# Patient Record
Sex: FEMALE | Race: ASIAN | Employment: FULL TIME | URBAN - METROPOLITAN AREA
[De-identification: names, ages, dates, MRNs, and addresses within clinical notes are randomized per-mention and may not be internally consistent; named-entity substitution may affect disease eponyms.]

---

## 2017-09-13 ENCOUNTER — OFFICE VISIT (OUTPATIENT)
Dept: FAMILY MEDICINE CLINIC | Age: 27
End: 2017-09-13

## 2017-09-13 VITALS
SYSTOLIC BLOOD PRESSURE: 108 MMHG | WEIGHT: 124.2 LBS | OXYGEN SATURATION: 98 % | BODY MASS INDEX: 19.96 KG/M2 | HEART RATE: 74 BPM | DIASTOLIC BLOOD PRESSURE: 61 MMHG | TEMPERATURE: 98.6 F | RESPIRATION RATE: 18 BRPM | HEIGHT: 66 IN

## 2017-09-13 DIAGNOSIS — Z00.00 ROUTINE GENERAL MEDICAL EXAMINATION AT A HEALTH CARE FACILITY: Primary | ICD-10-CM

## 2017-09-13 NOTE — PROGRESS NOTES
1. Have you been to the ER, urgent care clinic since your last visit? Hospitalized since your last visit?no  2. Have you seen or consulted any other health care providers outside of the [address] [place] [address] since your last visit? Include any pap smears or colon screening. No      Chief Complaint   Patient presents with    Complete Physical     annual exam     Learning assessment complete  Abuse Screening Questionnaire 9/13/2017   Do you ever feel afraid of your partner? N   Are you in a relationship with someone who physically or mentally threatens you? N   Is it safe for you to go home? Y     Fall Risk Assessment, last 12 mths 9/13/2017   Able to walk? Yes   Fall in past 12 months?  No

## 2017-09-13 NOTE — PROGRESS NOTES
Subjective:   27 y.o. female for Well Woman Check. Her gyne and breast care is done elsewhere by her Ob-Gyne physician. Patient Active Problem List    Diagnosis Date Noted    Dysmenorrhea     Fibrocystic breast changes 07/08/2014    Fibrocystic breast 07/08/2014     Current Outpatient Prescriptions   Medication Sig Dispense Refill    norethindrone-ethinyl estradiol-iron (LOESTRIN FE 1.5/30, 28-DAY,) 1.5 mg-30 mcg (21)/75 mg (7) tab Take 1 Tab by mouth daily. 28 Tab 0    calcipotriene (DOVONEX) 0.005 % topical cream Apply  to affected area two (2) times a day. Solution -apply at hs 60 g 0     No Known Allergies  Past Medical History:   Diagnosis Date    Dysmenorrhea      History reviewed. No pertinent surgical history. History reviewed. No pertinent family history. Social History   Substance Use Topics    Smoking status: Former Smoker    Smokeless tobacco: Never Used      Comment: quit 1 year ago. DId smoike close to 1 PPD for 1.5 years, then 1/2 PPD, then down to 304 cig a day 2 years ago, then quit.  Alcohol use No         ROS: Feeling generally well. No TIA's or unusual headaches, no dysphagia. No prolonged cough. No dyspnea or chest pain on exertion. No abdominal pain, change in bowel habits, black or bloody stools. No urinary tract symptoms. No new or unusual musculoskeletal symptoms. Specific concerns today: none    Objective: The patient appears well, alert, oriented x 3, in no distress. Visit Vitals    /61 (BP 1 Location: Right arm, BP Patient Position: Sitting)    Pulse 74    Temp 98.6 °F (37 °C) (Oral)    Resp 18    Ht 5' 6\" (1.676 m)    Wt 124 lb 3.2 oz (56.3 kg)    LMP 08/23/2017    SpO2 98%    BMI 20.05 kg/m2     ENT normal.  Neck supple. No adenopathy or thyromegaly. SERAFIN. Lungs are clear, good air entry, no wheezes, rhonchi or rales. S1 and S2 normal, no murmurs, regular rate and rhythm. Abdomen soft without tenderness, guarding, mass or organomegaly. Extremities show no edema, normal peripheral pulses. Neurological is normal, no focal findings. Breast and Pelvic exams are deferred. Assessment/Plan:   Well Woman  routine labs ordered    ICD-10-CM ICD-9-CM    1.  Routine general medical examination at a health care facility Z00.00 V70.0 CBC W/O DIFF      LIPID PANEL      METABOLIC PANEL, COMPREHENSIVE      VITAMIN D, 25 HYDROXY      TSH 3RD GENERATION   await labs  Pt was given an after visit summary which includes diagnosis, current medicines and vital and voiced understanding of treatment plan

## 2017-09-13 NOTE — MR AVS SNAPSHOT
Visit Information Date & Time Provider Department Dept. Phone Encounter #  
 9/13/2017  9:00 AM Sebastien Breaux  [facility] [phone] [id number] Upcoming Health Maintenance Date Due DTaP/Tdap/Td series (1 - Tdap) 12/29/2009 PAP AKA CERVICAL CYTOLOGY 8/10/2017 Allergies as of 9/13/2017  Review Complete On: 9/13/2017 By: Sebastien Breaux NP No Known Allergies Current Immunizations  Never Reviewed No immunizations on file. Not reviewed this visit You Were Diagnosed With   
  
 Codes Comments Routine general medical examination at a health care facility    -  Primary ICD-10-CM: Z00.00 ICD-9-CM: V70.0 Vitals BP Pulse Temp Resp Height(growth percentile) Weight(growth percentile) 108/61 (BP 1 Location: Right arm, BP Patient Position: Sitting) 74 98.6 °F (37 °C) (Oral) 18 5' 6\" (1.676 m) 124 lb 3.2 oz (56.3 kg) LMP SpO2 BMI OB Status Smoking Status 08/23/2017 98% 20.05 kg/m2 Having regular periods Former Smoker Vitals History BMI and BSA Data Body Mass Index Body Surface Area 20.05 kg/m 2 1.62 m 2 Preferred Pharmacy Pharmacy Name Phone [address] [facility] [email or web address] [phone] Your Updated Medication List  
  
   
This list is accurate as of: 9/13/17  9:30 AM.  Always use your most recent med list.  
  
  
  
  
 calcipotriene 0.005 % topical cream  
Commonly known as:  Hunter Savin Apply  to affected area two (2) times a day. Solution -apply at hs  
  
 norethindrone-ethinyl estradiol-iron 1.5 mg-30 mcg (21)/75 mg (7) Tab Commonly known as:  LOESTRIN FE 1.5/30 (28-DAY) Take 1 Tab by mouth daily. We Performed the Following CBC W/O DIFF [14545 CPT(R)] LIPID PANEL [97944 CPT(R)] METABOLIC PANEL, COMPREHENSIVE [74278 CPT(R)] TSH 3RD GENERATION [70019 CPT(R)] VITAMIN D, 25 HYDROXY [id number] CPT(R)] Introducing [facility]! Susan Diego introduces [employer] patient portal. Now you can access parts of your medical record, email your doctor's office, and request medication refills online. 1. In your internet browser, go to [email or web address] 2. Click on the First Time User? Click Here link in the Sign In box. You will see the New Member Sign Up page. 3. Enter your [employer] Access Code exactly as it appears below. You will not need to use this code after youve completed the sign-up process. If you do not sign up before the expiration date, you must request a new code. · [employer] Access Code: [email or web address] Expires: 12/12/2017  9:30 AM 
 
4. Enter the last four digits of your Social Security Number (xxxx) and Date of Birth (mm/dd/yyyy) as indicated and click Submit. You will be taken to the next sign-up page. 5. Create a [employer] ID. This will be your [employer] login ID and cannot be changed, so think of one that is secure and easy to remember. 6. Create a [employer] password. You can change your password at any time. 7. Enter your Password Reset Question and Answer. This can be used at a later time if you forget your password. 8. Enter your e-mail address. You will receive e-mail notification when new information is available in [address] 9. Click Sign Up. You can now view and download portions of your medical record. 10. Click the Download Summary menu link to download a portable copy of your medical information. If you have questions, please visit the Frequently Asked Questions section of the [employer] website. Remember, [employer] is NOT to be used for urgent needs. For medical emergencies, dial 911. Now available from your iPhone and Android! Please provide this summary of care documentation to your next provider. Your primary care clinician is listed as Derrick Fishman.  If you have any questions after today's visit, please call [phone].

## 2017-09-14 LAB
25(OH)D3+25(OH)D2 SERPL-MCNC: 15.3 NG/ML (ref 30–100)
ALBUMIN SERPL-MCNC: 4.7 G/DL (ref 3.5–5.5)
ALBUMIN/GLOB SERPL: 1.7 {RATIO} (ref 1.2–2.2)
ALP SERPL-CCNC: 59 IU/L (ref 39–117)
ALT SERPL-CCNC: 16 IU/L (ref 0–32)
AST SERPL-CCNC: 14 IU/L (ref 0–40)
BILIRUB SERPL-MCNC: 0.9 MG/DL (ref 0–1.2)
BUN SERPL-MCNC: 8 MG/DL (ref 6–20)
BUN/CREAT SERPL: 14 (ref 9–23)
CALCIUM SERPL-MCNC: 9.8 MG/DL (ref 8.7–10.2)
CHLORIDE SERPL-SCNC: 101 MMOL/L (ref 96–106)
CHOLEST SERPL-MCNC: 157 MG/DL (ref 100–199)
CO2 SERPL-SCNC: 22 MMOL/L (ref 18–29)
CREAT SERPL-MCNC: 0.56 MG/DL (ref 0.57–1)
ERYTHROCYTE [DISTWIDTH] IN BLOOD BY AUTOMATED COUNT: 13.2 % (ref 12.3–15.4)
GLOBULIN SER CALC-MCNC: 2.7 G/DL (ref 1.5–4.5)
GLUCOSE SERPL-MCNC: 77 MG/DL (ref 65–99)
HCT VFR BLD AUTO: 38.6 % (ref 34–46.6)
HDLC SERPL-MCNC: 46 MG/DL
HGB BLD-MCNC: 12.9 G/DL (ref 11.1–15.9)
INTERPRETATION, 910389: NORMAL
LDLC SERPL CALC-MCNC: 88 MG/DL (ref 0–99)
MCH RBC QN AUTO: 30.5 PG (ref 26.6–33)
MCHC RBC AUTO-ENTMCNC: 33.4 G/DL (ref 31.5–35.7)
MCV RBC AUTO: 91 FL (ref 79–97)
PLATELET # BLD AUTO: 209 X10E3/UL (ref 150–379)
POTASSIUM SERPL-SCNC: 4.2 MMOL/L (ref 3.5–5.2)
PROT SERPL-MCNC: 7.4 G/DL (ref 6–8.5)
RBC # BLD AUTO: 4.23 X10E6/UL (ref 3.77–5.28)
SODIUM SERPL-SCNC: 140 MMOL/L (ref 134–144)
TRIGL SERPL-MCNC: 114 MG/DL (ref 0–149)
TSH SERPL DL<=0.005 MIU/L-ACNC: 1.27 UIU/ML (ref 0.45–4.5)
VLDLC SERPL CALC-MCNC: 23 MG/DL (ref 5–40)
WBC # BLD AUTO: 5.8 X10E3/UL (ref 3.4–10.8)

## 2017-10-11 ENCOUNTER — TELEPHONE (OUTPATIENT)
Dept: FAMILY MEDICINE CLINIC | Age: 27
End: 2017-10-11

## 2017-10-11 NOTE — TELEPHONE ENCOUNTER
Patient would like to have her lab result mail to her home address. She can be reach at [phone].  Patient address on file verified  Thank you

## 2018-07-24 LAB
CHLAMYDIA, EXTERNAL: NEGATIVE
HBSAG, EXTERNAL: NEGATIVE
HIV, EXTERNAL: NONREACTIVE
N. GONORRHEA, EXTERNAL: NEGATIVE
RUBELLA, EXTERNAL: NORMAL
T. PALLIDUM, EXTERNAL: NEGATIVE

## 2018-12-06 LAB
ANTIBODY SCREEN, EXTERNAL: NEGATIVE
TYPE, ABO & RH, EXTERNAL: NORMAL

## 2019-02-01 LAB — GRBS, EXTERNAL: NEGATIVE

## 2019-03-07 ENCOUNTER — HOSPITAL ENCOUNTER (INPATIENT)
Age: 29
LOS: 3 days | Discharge: HOME | End: 2019-03-10
Attending: OBSTETRICS & GYNECOLOGY | Admitting: OBSTETRICS & GYNECOLOGY
Payer: COMMERCIAL

## 2019-03-07 PROBLEM — Z87.59 PERSONAL HISTORY OF PREVIOUS POSTDATES PREGNANCY: Status: ACTIVE | Noted: 2019-03-07

## 2019-03-07 LAB
ERYTHROCYTE [DISTWIDTH] IN BLOOD BY AUTOMATED COUNT: 14.3 % (ref 11.5–14.5)
HCT VFR BLD AUTO: 37.8 % (ref 35–47)
HGB BLD-MCNC: 12.4 G/DL (ref 11.5–16)
MCH RBC QN AUTO: 30.7 PG (ref 26–34)
MCHC RBC AUTO-ENTMCNC: 32.8 G/DL (ref 30–36.5)
MCV RBC AUTO: 93.6 FL (ref 80–99)
NRBC # BLD: 0 K/UL (ref 0–0.01)
NRBC BLD-RTO: 0 PER 100 WBC
PLATELET # BLD AUTO: 153 K/UL (ref 150–400)
PMV BLD AUTO: 12.5 FL (ref 8.9–12.9)
RBC # BLD AUTO: 4.04 M/UL (ref 3.8–5.2)
WBC # BLD AUTO: 8.6 K/UL (ref 3.6–11)

## 2019-03-07 PROCEDURE — 85027 COMPLETE CBC AUTOMATED: CPT

## 2019-03-07 PROCEDURE — 65270000029 HC RM PRIVATE

## 2019-03-07 PROCEDURE — 59200 INSERT CERVICAL DILATOR: CPT

## 2019-03-07 PROCEDURE — 36415 COLL VENOUS BLD VENIPUNCTURE: CPT

## 2019-03-07 PROCEDURE — 3E0P7VZ INTRODUCTION OF HORMONE INTO FEMALE REPRODUCTIVE, VIA NATURAL OR ARTIFICIAL OPENING: ICD-10-PCS | Performed by: OBSTETRICS & GYNECOLOGY

## 2019-03-07 PROCEDURE — 74011250637 HC RX REV CODE- 250/637: Performed by: OBSTETRICS & GYNECOLOGY

## 2019-03-07 PROCEDURE — 75410000002 HC LABOR FEE PER 1 HR

## 2019-03-07 PROCEDURE — 94760 N-INVAS EAR/PLS OXIMETRY 1: CPT

## 2019-03-07 RX ORDER — ONDANSETRON 2 MG/ML
4 INJECTION INTRAMUSCULAR; INTRAVENOUS
Status: DISCONTINUED | OUTPATIENT
Start: 2019-03-07 | End: 2019-03-09 | Stop reason: HOSPADM

## 2019-03-07 RX ORDER — FENTANYL CITRATE 50 UG/ML
100 INJECTION, SOLUTION INTRAMUSCULAR; INTRAVENOUS
Status: DISCONTINUED | OUTPATIENT
Start: 2019-03-07 | End: 2019-03-09 | Stop reason: HOSPADM

## 2019-03-07 RX ORDER — TERBUTALINE SULFATE 1 MG/ML
0.25 INJECTION SUBCUTANEOUS AS NEEDED
Status: DISCONTINUED | OUTPATIENT
Start: 2019-03-07 | End: 2019-03-09 | Stop reason: HOSPADM

## 2019-03-07 RX ORDER — OXYTOCIN/0.9 % SODIUM CHLORIDE 30/500 ML
0-25 PLASTIC BAG, INJECTION (ML) INTRAVENOUS
Status: DISCONTINUED | OUTPATIENT
Start: 2019-03-08 | End: 2019-03-09 | Stop reason: HOSPADM

## 2019-03-07 RX ORDER — NALBUPHINE HYDROCHLORIDE 10 MG/ML
10 INJECTION, SOLUTION INTRAMUSCULAR; INTRAVENOUS; SUBCUTANEOUS
Status: DISCONTINUED | OUTPATIENT
Start: 2019-03-07 | End: 2019-03-09 | Stop reason: HOSPADM

## 2019-03-07 RX ORDER — ZOLPIDEM TARTRATE 5 MG/1
5 TABLET ORAL
Status: DISCONTINUED | OUTPATIENT
Start: 2019-03-07 | End: 2019-03-10 | Stop reason: HOSPADM

## 2019-03-07 RX ADMIN — ZOLPIDEM TARTRATE 5 MG: 5 TABLET ORAL at 21:08

## 2019-03-07 RX ADMIN — DINOPROSTONE 10 MG: 10 INSERT VAGINAL at 18:26

## 2019-03-07 NOTE — H&P
Print      Patient  Name MARIELENA Craig) ID# [id number] Appt. Date/Time 2019 06:06PM    1990 [address] _[facility]   Provider Giselle Mckeon MD   Insurance [facility]  Insurance # : [id number]  Policy/Group # : [id number]  Employer Name : [employer]  Prescription: Vancleave Neva - Member is eligible. details   Patient's Care Team  OB/GYN: Elias Canela MD: [id number], [place], [address] [facility], Ph [phone], Fax [phone] NPI: [id number]  Notes: no pcp  Patient's Pharmacies  [address]): [address], [address] [place] [address], Ph [phone], Fax [phone]  Medications  Reviewed Medications     Prenatal Vitamin tablet  Internal Note: Entered By: Reinaldo Cerda LPN - Team 1 SHPSigned By: Reinaldo Cerda LPN - Team 1 SHPUncoded: NBMN: N, start 10/11/2017 10/11/17   filled [facility]. [address]   Vaccines  Vaccines not reviewed (last reviewed 2019)    Vaccine Type Date Amt. Route Site [address] Lot # Mfr. Exp. Date Date  on VIS VIS  Given Vaccinator   Diphtheria, Tetanus, Pertussis   Tdap 18 0.5 mL Intramuscular Deltoid, Right  [id number] [employer] 03/09/21 02/24/15 12/06/18 Carola Barreto                                                     HPV   HPV, quadrivalent 14                                                               Hepatitis B   Hep B 18                                                               Problems  Reviewed Problems     Pregnant - Onset: 2018  Family History  Discussed Family History    Family history transferred to MU2 compliant F: Has Family History of Lung Cancer   Social History  Discussed Social History    Surgical History  Reviewed Surgical History     No previous surgery  GYN History  Reviewed GYN History  Date of Last Pap Smear: 10/11/2017 (Notes: NIL). Obstetric History  Reviewed Obstetric History    TOTAL FULL PRE AB. I AB.  S ECTOPICS MULTIPLE LIVING   1 0 0     0 Pregnancy Problem List   Normal pregnancy - Primigravida, girl, normal anatomy scan [facility] delivery    PNR Sent to [facility] L&D -kristel  Prenatal Flowsheet  Fundus Pres FHR FM PLS Cervix Exam BP Wt Edema Glucose Protein Leukocytes Nitrite Ketones Blood   10/15/2018                             142       none neg       10/15/2018     btozer                       20 cm  142 No   106/60 135 lbs          Comments: Girl!! Normal anatomy scan today. Feeling well. Reports leg cramps at night - happened twice. Encouraged the flu vaccine, patient counseled. She'll consider. Will look into classes, info provided. 11/13/2018     btozer                       24 cm  152 Yes   98/62 138 lbs  trace neg       Comments: Feeling well, states slight abdominal pain mostly in the afternoon. Declines flu shot, will think about it. Exercise precautions reviewed, doing barre and body flow. Will sign up for classes. Pediatrician list provided. 12/06/2018     btozer                       28 cm  140 Yes   94/58 142 lbs none none neg       Comments: Glucola, tdap today. Wants to review kick counts; C/o belly being really tight and like a muscle stretching causing pain - mostly every afternoon. Counseled about tdap for patient and family members. Encouraged classes, ruperto, pediatrician. 12/21/2018     btozer                       30 cm  150 Increased   110/68 147 lbs trace 4+ neg       Comments: C/O foot cramps, disturbing sleep. Patient wondering about baby's weight/size? ? Patient denies any headaches, n/v, vaginal bleeding or abnormal discharge. Baby is very active. Taking classes. 01/07/2019     btozer                       32 cm  147 Yes   102/68 147 lbs none none neg       Comments: Feeling well overall and baby is active. Reports a slight rash around her belly and neck - has actually been there for most of the pregnancy. On exam she has scant small skin tags.  She has occasionally noticed a fast heart rate when sitting, lasts less than 10 seconds, may be associated with reflux. Will try smaller meals, Tums with her spicy food. 01/23/2019     btozer                       35 cm  144 Yes   100/64 150 lbs none         Comments: Baby is active. Having more lower pelvic pain. Discussed labor precautions, on call number, hospital bag. GBS next visit. 02/01/2019     [email or web address]                       36 cm  143 Yes  0cm / 30% / -3 96/58 150 lbs trace         Comments: Rm 22 Slight swelling and soreness in hands and legs. B/P and weight look good. Doubt Pre-Eclampsia. Good fetal movement. GBS obt, Cx closed. Reviewed Labor instructions again. RTO 1 week   02/08/2019     btozer                       37 cm Vertex 150 Yes  1cm / 30% / -3 106/68 152 lbs trace none neg       Comments: GBS negative. Noticed that the past two weeks felt some slight cramping, denies bleeding/discharge. Baby is active. Labor precautions reviewed. 02/15/2019     btozer                       38 cm Vertex 146 Yes  1cm / 30% / -3 106/64 152 lbs trace none neg       Comments: concerned about lack of weight gain- reassurance that she has gained 26lbs which is in the normal expected range. BHC/ cramping, no bleeding or LOF. Baby is active. 02/22/2019     btozer                       39 cm Vertex 141 Yes  1cm / 30% / -3 110/74 154 lbs trace trace neg       Comments: Having tooth pain - same spot where she had a root canal a year ago. Went to the dentist three weeks ago and was everything fine, but now sees a little erythema there - recommend she see her dentist for evaluation. Baby is moving active but moving less -she thinks it's because there is less room. Kick counts reviewed. 03/01/2019     btozer                       39 cm Vertex 145 Yes  2cm / 30% / -3 98/60 152 lbs trace none neg       Comments: Baby is active. Reports light brown discharge past Sunday. Risks/ benefits of IOL reviewed - plan IOL next Friday with cervical ripening the night before if still pregnant. 03/07/2019     [email or web address]                                        OB Lab Results    Result Value Ref.  Range Date Collected Date Reviewed Reviewed By Note   Initial Labs             Blood Type O n/a n/a n/a n/a entered manually on 10/15/2018       D (Rh) Type Positive n/a n/a n/a n/a entered manually on 10/15/2018       Antibody Screen Negative NEGATIVE 12/06/2018 12/09/2018 btozer        HCT - Initial             HGB - Initial             MCV - Initial             PLT - Initial             VDRL - Initial             Urine Culture/Screen             HBsAg             HIV Counseling/Testing             Chlamydia - Initial             Gonorrhea - Initial             Varicella             Rubella         Optional Labs             HGB Electrophoresis             PPD/Quanta             Pap Test             Cystic Fibrosis             HPV             Eder-Sac             Familial Dysautonomia             Genetic Screening Tests             NST             TSH             Drug screen             HCV Ab             HCV RNA             Urinalysis             Rhogam Injection         8-20 Week Labs             Ultrasound - Initial             1st Trimester Aneuploidy Risk Assessment             MSAFP/Multiple Markers Report  09/10/2018 09/14/2018 [email or web address]        2nd Trimester Serum Screening Negative  09/10/2018 09/14/2018 [email or web address]        Amnio/CVS             Karyotype             Amniotic Fluid (AFP)         24-28 Week Labs             HCT - 24-28 Weeks 36.2 % 34.0-46.6 12/06/2018 12/09/2018 btozer        HGB - 24-28 Weeks 12.0 g/dL 11.1-15.9 12/06/2018 12/09/2018 btozer        MCV - 24-28 Weeks             PLT - 24-28 Weeks 199 x10E3/uL 150-379 12/06/2018 12/09/2018 btozer        Diabetes Screen 112 mg/dL  12/06/2018 12/09/2018 btozer        GTT (If Screen Abnormal)             D (Rh) Antibody Screen         32-36 Week Labs             HCT - 32-36 Weeks             HGB - 32-36 Weeks             MCV - 32-36 Weeks PLT - 32-36 Weeks             Ultrasound - 32-36 Weeks             HIV (When Indicated)             VDRL - 32-36 Weeks   12/06/2018 12/09/2018 btozer        Gonorrhea - 32-36 Weeks             Chlamydia - 32-36 Weeks             Depression screening (when indicated)         35-37 Week Labs             Group B Strep Negative NEGATIVE 02/01/2019 02/03/2019 [email or web address]        Resistance testing if penicillin allergic         Other Labs             Other         Past Medical History  Discussed Past Medical History  No significant past medical history: Y  HPI  Patient presents to L&D for induction of labor for post-dates  ROS  Additionally reports: Except as noted in the HPI, the review of systems is negative for General, Breast, , Resp, GI, CV, Endo, MS, Psych and Heme. ROS as noted in the HPI  Physical Exam  Patient is a 49-year-old female. Constitutional: General Appearance: well developed and nourished and pleasant. Level of Distress: no acute distress. Ambulation: ambulating normally. Head: Head: normocephalic. Eyes: Extraocular Movements extraocular movements intact. Ears, Nose, Mouth, Throat: Ears normal hearing. Nose: no external nose lesions. Lungs / Chest: Respiratory effort: unlabored. Auscultation: no wheezing. Cardiovascular: Rate And Rhythm: regular. Heart Sounds: no murmurs. Neurologic: Gait and Station: normal gait. Sensation: grossly intact. Motor: grossly intact. Mental Status Exam: Orientation oriented to person, place, and time. Assessment / Plan  1. Pregnant -  plan IOL for post-dates with cervidil  GBS neg  Z33.1: Pregnant state, incidental      Return to Office   Blaine Abdalla MD for Return OB at [facility] () on 03/08/2019 at 06:00 AM  Encounter Sign-Off  Encounter signed-off by Rianna Ochoa MD, 03/07/2019.   Encounter performed and documented by Rianna Ochoa MD   Encounter reviewed & signed by Rianna Ochoa MD on 03/07/2019 at 6:10pm    There is not enough information to calculate an E&M code      Audit history

## 2019-03-08 ENCOUNTER — ANESTHESIA (OUTPATIENT)
Dept: LABOR AND DELIVERY | Age: 29
End: 2019-03-08
Payer: COMMERCIAL

## 2019-03-08 ENCOUNTER — ANESTHESIA EVENT (OUTPATIENT)
Dept: LABOR AND DELIVERY | Age: 29
End: 2019-03-08
Payer: COMMERCIAL

## 2019-03-08 PROCEDURE — 74011250636 HC RX REV CODE- 250/636: Performed by: OBSTETRICS & GYNECOLOGY

## 2019-03-08 PROCEDURE — 76060000078 HC EPIDURAL ANESTHESIA

## 2019-03-08 PROCEDURE — 75410000003 HC RECOV DEL/VAG/CSECN EA 0.5 HR

## 2019-03-08 PROCEDURE — 74011250636 HC RX REV CODE- 250/636: Performed by: ANESTHESIOLOGY

## 2019-03-08 PROCEDURE — 74011000250 HC RX REV CODE- 250

## 2019-03-08 PROCEDURE — 74011250637 HC RX REV CODE- 250/637: Performed by: ADVANCED PRACTICE MIDWIFE

## 2019-03-08 PROCEDURE — A4300 CATH IMPL VASC ACCESS PORTAL: HCPCS

## 2019-03-08 PROCEDURE — 74011250636 HC RX REV CODE- 250/636

## 2019-03-08 PROCEDURE — 75410000002 HC LABOR FEE PER 1 HR

## 2019-03-08 PROCEDURE — 75410000000 HC DELIVERY VAGINAL/SINGLE

## 2019-03-08 PROCEDURE — 3E0R3BZ INTRODUCTION OF ANESTHETIC AGENT INTO SPINAL CANAL, PERCUTANEOUS APPROACH: ICD-10-PCS | Performed by: ANESTHESIOLOGY

## 2019-03-08 PROCEDURE — 0HQ9XZZ REPAIR PERINEUM SKIN, EXTERNAL APPROACH: ICD-10-PCS | Performed by: ADVANCED PRACTICE MIDWIFE

## 2019-03-08 PROCEDURE — 65270000029 HC RM PRIVATE

## 2019-03-08 RX ORDER — NALOXONE HYDROCHLORIDE 0.4 MG/ML
0.4 INJECTION, SOLUTION INTRAMUSCULAR; INTRAVENOUS; SUBCUTANEOUS AS NEEDED
Status: DISCONTINUED | OUTPATIENT
Start: 2019-03-08 | End: 2019-03-09 | Stop reason: HOSPADM

## 2019-03-08 RX ORDER — FENTANYL CITRATE 50 UG/ML
100 INJECTION, SOLUTION INTRAMUSCULAR; INTRAVENOUS ONCE
Status: DISCONTINUED | OUTPATIENT
Start: 2019-03-08 | End: 2019-03-09 | Stop reason: HOSPADM

## 2019-03-08 RX ORDER — SODIUM CHLORIDE 0.9 % (FLUSH) 0.9 %
SYRINGE (ML) INJECTION
Status: DISPENSED
Start: 2019-03-08 | End: 2019-03-09

## 2019-03-08 RX ORDER — FENTANYL/BUPIVACAINE/NS/PF 2-1250MCG
1-16 PREFILLED PUMP RESERVOIR EPIDURAL CONTINUOUS
Status: DISCONTINUED | OUTPATIENT
Start: 2019-03-08 | End: 2019-03-10 | Stop reason: HOSPADM

## 2019-03-08 RX ORDER — BUPIVACAINE HYDROCHLORIDE 5 MG/ML
30 INJECTION, SOLUTION EPIDURAL; INTRACAUDAL AS NEEDED
Status: DISCONTINUED | OUTPATIENT
Start: 2019-03-08 | End: 2019-03-09 | Stop reason: HOSPADM

## 2019-03-08 RX ORDER — LANOLIN ALCOHOL/MO/W.PET/CERES
1 CREAM (GRAM) TOPICAL
Status: DISCONTINUED | OUTPATIENT
Start: 2019-03-09 | End: 2019-03-10 | Stop reason: HOSPADM

## 2019-03-08 RX ORDER — ACETAMINOPHEN 325 MG/1
650 TABLET ORAL
Status: DISCONTINUED | OUTPATIENT
Start: 2019-03-08 | End: 2019-03-10 | Stop reason: HOSPADM

## 2019-03-08 RX ORDER — BUPIVACAINE HYDROCHLORIDE 2.5 MG/ML
INJECTION, SOLUTION EPIDURAL; INFILTRATION; INTRACAUDAL AS NEEDED
Status: DISCONTINUED | OUTPATIENT
Start: 2019-03-08 | End: 2019-03-08 | Stop reason: HOSPADM

## 2019-03-08 RX ORDER — IBUPROFEN 400 MG/1
800 TABLET ORAL EVERY 8 HOURS
Status: DISCONTINUED | OUTPATIENT
Start: 2019-03-08 | End: 2019-03-10 | Stop reason: HOSPADM

## 2019-03-08 RX ORDER — LIDOCAINE HYDROCHLORIDE AND EPINEPHRINE 15; 5 MG/ML; UG/ML
INJECTION, SOLUTION EPIDURAL AS NEEDED
Status: DISCONTINUED | OUTPATIENT
Start: 2019-03-08 | End: 2019-03-08 | Stop reason: HOSPADM

## 2019-03-08 RX ORDER — EPHEDRINE SULFATE 50 MG/ML
12.5 INJECTION, SOLUTION INTRAVENOUS
Status: DISCONTINUED | OUTPATIENT
Start: 2019-03-08 | End: 2019-03-09 | Stop reason: HOSPADM

## 2019-03-08 RX ORDER — HYDROCODONE BITARTRATE AND ACETAMINOPHEN 5; 325 MG/1; MG/1
1 TABLET ORAL
Status: DISCONTINUED | OUTPATIENT
Start: 2019-03-08 | End: 2019-03-10 | Stop reason: HOSPADM

## 2019-03-08 RX ORDER — HYDROCORTISONE ACETATE PRAMOXINE HCL 2.5; 1 G/100G; G/100G
CREAM TOPICAL AS NEEDED
Status: DISCONTINUED | OUTPATIENT
Start: 2019-03-08 | End: 2019-03-10 | Stop reason: HOSPADM

## 2019-03-08 RX ORDER — SIMETHICONE 80 MG
80 TABLET,CHEWABLE ORAL
Status: DISCONTINUED | OUTPATIENT
Start: 2019-03-08 | End: 2019-03-10 | Stop reason: HOSPADM

## 2019-03-08 RX ORDER — BUPIVACAINE HYDROCHLORIDE 5 MG/ML
INJECTION, SOLUTION EPIDURAL; INTRACAUDAL
Status: DISPENSED
Start: 2019-03-08 | End: 2019-03-09

## 2019-03-08 RX ORDER — SWAB
1 SWAB, NON-MEDICATED MISCELLANEOUS DAILY
Status: DISCONTINUED | OUTPATIENT
Start: 2019-03-09 | End: 2019-03-10 | Stop reason: HOSPADM

## 2019-03-08 RX ORDER — SODIUM CHLORIDE 0.9 % (FLUSH) 0.9 %
SYRINGE (ML) INJECTION
Status: COMPLETED
Start: 2019-03-08 | End: 2019-03-08

## 2019-03-08 RX ORDER — FENTANYL CITRATE 50 UG/ML
INJECTION, SOLUTION INTRAMUSCULAR; INTRAVENOUS
Status: COMPLETED
Start: 2019-03-08 | End: 2019-03-08

## 2019-03-08 RX ORDER — OXYTOCIN/RINGER'S LACTATE 20/1000 ML
125-500 PLASTIC BAG, INJECTION (ML) INTRAVENOUS ONCE
Status: ACTIVE | OUTPATIENT
Start: 2019-03-08 | End: 2019-03-09

## 2019-03-08 RX ORDER — MISOPROSTOL 200 UG/1
1000 TABLET ORAL ONCE
Status: COMPLETED | OUTPATIENT
Start: 2019-03-08 | End: 2019-03-08

## 2019-03-08 RX ORDER — FENTANYL/BUPIVACAINE/NS/PF 2-1250MCG
PREFILLED PUMP RESERVOIR EPIDURAL
Status: COMPLETED
Start: 2019-03-08 | End: 2019-03-08

## 2019-03-08 RX ORDER — NALOXONE HYDROCHLORIDE 0.4 MG/ML
0.4 INJECTION, SOLUTION INTRAMUSCULAR; INTRAVENOUS; SUBCUTANEOUS AS NEEDED
Status: DISCONTINUED | OUTPATIENT
Start: 2019-03-08 | End: 2019-03-10 | Stop reason: HOSPADM

## 2019-03-08 RX ORDER — FENTANYL CITRATE 50 UG/ML
INJECTION, SOLUTION INTRAMUSCULAR; INTRAVENOUS AS NEEDED
Status: DISCONTINUED | OUTPATIENT
Start: 2019-03-08 | End: 2019-03-08 | Stop reason: HOSPADM

## 2019-03-08 RX ORDER — BUPIVACAINE HYDROCHLORIDE 2.5 MG/ML
INJECTION, SOLUTION EPIDURAL; INFILTRATION; INTRACAUDAL
Status: COMPLETED
Start: 2019-03-08 | End: 2019-03-08

## 2019-03-08 RX ORDER — EPHEDRINE SULFATE 50 MG/ML
INJECTION, SOLUTION INTRAVENOUS
Status: COMPLETED
Start: 2019-03-08 | End: 2019-03-08

## 2019-03-08 RX ORDER — SODIUM CHLORIDE, SODIUM LACTATE, POTASSIUM CHLORIDE, CALCIUM CHLORIDE 600; 310; 30; 20 MG/100ML; MG/100ML; MG/100ML; MG/100ML
125 INJECTION, SOLUTION INTRAVENOUS CONTINUOUS
Status: DISCONTINUED | OUTPATIENT
Start: 2019-03-08 | End: 2019-03-10 | Stop reason: HOSPADM

## 2019-03-08 RX ADMIN — OXYTOCIN-SODIUM CHLORIDE 0.9% IV SOLN 30 UNIT/500ML 333 MILLI-UNITS/MIN: 30-0.9/5 SOLUTION at 21:25

## 2019-03-08 RX ADMIN — FENTANYL CITRATE 100 MCG: 50 INJECTION, SOLUTION INTRAMUSCULAR; INTRAVENOUS at 14:35

## 2019-03-08 RX ADMIN — SODIUM CHLORIDE, SODIUM LACTATE, POTASSIUM CHLORIDE, AND CALCIUM CHLORIDE 125 ML/HR: 600; 310; 30; 20 INJECTION, SOLUTION INTRAVENOUS at 17:41

## 2019-03-08 RX ADMIN — BUPIVACAINE HYDROCHLORIDE 8 ML: 2.5 INJECTION, SOLUTION EPIDURAL; INFILTRATION; INTRACAUDAL at 17:37

## 2019-03-08 RX ADMIN — Medication 10 ML/HR: at 15:18

## 2019-03-08 RX ADMIN — LIDOCAINE HYDROCHLORIDE AND EPINEPHRINE 2 ML: 15; 5 INJECTION, SOLUTION EPIDURAL at 14:52

## 2019-03-08 RX ADMIN — SODIUM CHLORIDE, SODIUM LACTATE, POTASSIUM CHLORIDE, AND CALCIUM CHLORIDE 125 ML/HR: 600; 310; 30; 20 INJECTION, SOLUTION INTRAVENOUS at 14:40

## 2019-03-08 RX ADMIN — FENTANYL CITRATE 100 MCG: 50 INJECTION, SOLUTION INTRAMUSCULAR; INTRAVENOUS at 14:53

## 2019-03-08 RX ADMIN — OXYTOCIN-SODIUM CHLORIDE 0.9% IV SOLN 30 UNIT/500ML 2 MILLI-UNITS/MIN: 30-0.9/5 SOLUTION at 07:14

## 2019-03-08 RX ADMIN — MISOPROSTOL 1000 MCG: 200 TABLET ORAL at 21:00

## 2019-03-08 RX ADMIN — SODIUM CHLORIDE, SODIUM LACTATE, POTASSIUM CHLORIDE, AND CALCIUM CHLORIDE 125 ML/HR: 600; 310; 30; 20 INJECTION, SOLUTION INTRAVENOUS at 07:00

## 2019-03-08 RX ADMIN — LIDOCAINE HYDROCHLORIDE AND EPINEPHRINE 3 ML: 15; 5 INJECTION, SOLUTION EPIDURAL at 14:54

## 2019-03-08 RX ADMIN — FENTANYL CITRATE 100 MCG: 50 INJECTION, SOLUTION INTRAMUSCULAR; INTRAVENOUS at 17:37

## 2019-03-08 RX ADMIN — BUPIVACAINE HYDROCHLORIDE 2 ML: 2.5 INJECTION, SOLUTION EPIDURAL; INFILTRATION; INTRACAUDAL at 14:55

## 2019-03-08 RX ADMIN — Medication 10 ML: at 07:00

## 2019-03-08 RX ADMIN — BUPIVACAINE HYDROCHLORIDE 2 ML: 2.5 INJECTION, SOLUTION EPIDURAL; INFILTRATION; INTRACAUDAL at 14:56

## 2019-03-08 RX ADMIN — EPHEDRINE SULFATE 10 MG: 50 INJECTION, SOLUTION INTRAVENOUS at 14:41

## 2019-03-08 NOTE — PROGRESS NOTES
1530: Bedside shift change report given to Annemarie Gonzalez RN (oncoming nurse) by Aziza Spear RN (offgoing nurse). Report included the following information SBAR, MAR and Recent Results. 1555: Dr. Shaniqua Ervin at the bedside assessing patient and  Road. AROM for scant clear fluid. SVE 6/90/-1.    1735: Dr. Jeanette Miranda at the bedside for epidural redose. : Spinning babies    : Dr. Shaniqua Ervin at the bedside assessing patient and [address]. SVE 9/100/+1. Bladder emptied for 800cc of urine. Patient flipped to the L side. : Patient experiencing intense pressure. Yesi Henderson CNM at the bedside assessing patient and FHT. SVE 10100/+2. Will begin pushing. :  of viable female baby by Mahesh Delgado CNM. Baby immediately placed skin to skin. Apgars 9, 9. TRANSFER - OUT REPORT:    Verbal report given to Radha (name) nathalie Gan  being transferred to MIU (unit) for routine progression of care       Report consisted of patients Situation, Background, Assessment and   Recommendations(SBAR). Information from the following report(s) SBAR, MAR and Recent Results was reviewed with the receiving nurse. Lines:   Peripheral IV 19 Anterior; Left Forearm (Active)   Site Assessment Clean, dry, & intact 3/8/2019  4:00 PM   Phlebitis Assessment 0 3/8/2019  4:00 PM   Infiltration Assessment 0 3/8/2019  4:00 PM   Dressing Status Clean, dry, & intact 3/8/2019  4:00 PM   Dressing Type Tape;Transparent 3/8/2019  4:00 PM   Hub Color/Line Status Pink;Capped 3/7/2019  8:02 PM   Alcohol Cap Used Yes 3/7/2019  8:02 PM        Opportunity for questions and clarification was provided.       Patient transported with:   Registered Nurse

## 2019-03-08 NOTE — ANESTHESIA PROCEDURE NOTES
Epidural Block    Start time: 3/8/2019 2:42 PM  End time: 3/8/2019 2:56 PM  Performed by: Simeon Ceballos MD  Authorized by: Simeon Ceballos MD     Pre-Procedure  Indication: labor epidural    Preanesthetic Checklist: patient identified, risks and benefits discussed, anesthesia consent, patient being monitored, timeout performed and anesthesia consent    Timeout Time: 14:42        Epidural:   Patient position:  Left lateral decubitus  Prep region:  Lumbar  Prep: Chlorhexidine    Location:  L2-3    Needle and Epidural Catheter:   Needle Type:  Tuohy  Needle Gauge:  17 G  Injection Technique:  Loss of resistance using air  Attempts:  1  Catheter Size:  19 G  Events: no blood with aspiration, no cerebrospinal fluid with aspiration, no paresthesia and negative aspiration test    Test Dose:  Bupivacaine 0.25% and negative    Assessment:   Catheter Secured:  Tegaderm and tape  Insertion:  Uncomplicated  Patient tolerance:  Patient tolerated the procedure well with no immediate complications

## 2019-03-08 NOTE — PROGRESS NOTES
Progress Note    Assuming care of patient. She was seen and examined and electronic chart was reviewed. Ms. Ximena Garcia is a 24 y/o  with IUP at 39 0/7 wks who was admitted for IOL secondary to late term. Her pregnancy has been uncomplicated. She received cervidil for cervical ripening overnight which was removed this am. Cervix 2/70/-2, very posterior. Pitocin was started and is currently at 2 mU/min. Ctx q3-4 minutes on toco. FHTs category 1 with baseline 150, moderate variability, +accels, -decels. GBS status negative. Will continue pitocin for labor augmentation. Plan for AROM when amenable.

## 2019-03-08 NOTE — PROGRESS NOTES
Bedside and Verbal shift change report given to Misael Stevenson, RN-Student and ROSANNA Ratliff RN (oncoming nurse) by Teresa Cameron RN (offgoing nurse). Report included the following information SBAR, Kardex, Procedure Summary, MAR and Procedure Verification. 0600 - In to see patient, cervidil out and up to shower. Patient said she slept well and wasn't feeling pain/contractions over the past three hours or so.    0700 - Started LR and Pitocin -  and moderate variability. 3657 - Patient up and walking halls    0930- Patient Napping    1030 - Did side lying pelvic release on Right and Left side    1200 - Patient on ball. Says contractions a little more intense but coping    1330 - Patient up walking, contractions intense but she is coping    1400 - Sarbjit Graven in to check patient, SVE 4/80/-2. Exam very painful for patient, epidural requested prior to AROM. Raju to AROM when epidural in place and patient comfortable. 1430 - Epidural in place    1530 - Bedside shift change report given to [address] [place] [address] [facility] by Misael Stevenson RN-Student and ROSANNA Ratliff RN (offgoing nurse). Report included the following information SBAR, Kardex, Procedure Summary, Intake/Output, MAR, Procedure Verification and Quality Measures.

## 2019-03-08 NOTE — PROGRESS NOTES
Labor Note    S: Feeling increased pain with ctx, desires epidural.     O:  Visit Vitals  /55   Pulse 81   Temp 99.4 °F (37.4 °C)   Resp 16   Ht 5' 6\" (1.676 m)   Wt 69.4 kg (153 lb)   SpO2 98%   Breastfeeding? No   BMI 24.69 kg/m²     Cervix- 4/80/-2, more anterior  Phil Campbell- ctx q2-3 minutes, pit at 16 mU/min  FHTs- category 1 (baseline 150, moderate variability, +accels, -decels)    A/P: 26 y/o  with IUP at 41 0/7 wks who was admitted for IOL secondary to late term.  GBS neg.   -Maternal well-being: VSS, will get epidural for pain  -Fetal well-being: Category 1 tracing   -Labor: Progressing, will plan AROM once comfortable with epidural, continue pitocin protocol

## 2019-03-08 NOTE — PROGRESS NOTES
6372 Patient arrived ambulatory to L&D  3 for post-dates cervidil induction. States positive fetal movement. Denies vaginal bleeding or loss of fluid. 2028 Dr. Andra Marie at bedside, viewed fetal tracing. Discussed plan of care. Bedside ultrasound to confirm vertex presentation. SVE 2/50/high. Cervidil placed without difficulty by provider. Plan to remove cervidil at 0600 and start pitocin at 0700.      2330 Bedside and Verbal shift change report given to VAN Milligan RN (oncoming nurse) by Debbie Davis RN (offgoing nurse). Report included the following information SBAR, Kardex, Procedure Summary, Intake/Output, MAR and Recent Results.

## 2019-03-08 NOTE — PROGRESS NOTES
2330~  Report and transfer of care from [address] [place] [address], [address]  0100~  Patient called out complaining of cramping and back pain. Position changes and monitoring initiated. 0130~  Patient states she's ok without further interventions at this time. Will call if contractions become more uncomfortable. 0300~  Patient sleeping.

## 2019-03-08 NOTE — PROGRESS NOTES
Labor Progress Note    25 y.o  at 39.11 for IOL for postdates pregnancy. Doing well. Denies DS or complaints.  Feeling UCs mildly, occasional.     Physical Exam:  Cervical Exam: not examined  Membranes:  Intact  Uterine Activity: Frequency: Irregular  Fetal Heart Rate: Reactive,135 moderate variability  Accelerations: yes  Decelerations: none    Assessment/Plan:  IUP 40.6  Cat 1 tracing  Cervidil tonight  Plan for pitocin at [address] [place] [address]

## 2019-03-08 NOTE — PROGRESS NOTES
Labor Progress Note    Patient seen, fetal heart rate and contraction pattern evaluated. Pt sleeping. Had been feeling some UCs throughout the night, declined pain meds and has been sleeping since.      Physical Exam:  Cervical Exam: not examined  Membranes:  Intact  Uterine Activity: Frequency: Irregular  Fetal Heart Rate: not on EFM    Assessment/Plan:  IUP 41 wks  Plan to remove cervidil at 0600, start pitocin at [address] [facility], [facility]

## 2019-03-08 NOTE — PROGRESS NOTES
Labor Note    S: Feeling more comfortable after rebolus of epidural.     O:  Visit Vitals  BP 93/54   Pulse 99   Temp 99.4 °F (37.4 °C)   Resp 16   Ht 5' 6\" (1.676 m)   Wt 69.4 kg (153 lb)   SpO2 98%   Breastfeeding? No   BMI 24.69 kg/m²     Cervix- 9/100/+1  AROM performed  John Sevier- ctx q2-3 minutes, pit at 8 mU/min  FHTs- category 1 (baseline 150, moderate variability, +accels, -decels)    A/P: 24 y/o  with IUP at 41 0/7 wks who was admitted for IOL secondary to late term.  GBS neg.   -Maternal well-being: VSS, epidural for pain  -Fetal well-being: Category 1 tracing   -Labor: Progressing, continue pitocin protocol

## 2019-03-09 PROCEDURE — 74011250637 HC RX REV CODE- 250/637: Performed by: ADVANCED PRACTICE MIDWIFE

## 2019-03-09 PROCEDURE — 65410000002 HC RM PRIVATE OB

## 2019-03-09 RX ADMIN — IBUPROFEN 800 MG: 400 TABLET, FILM COATED ORAL at 01:26

## 2019-03-09 RX ADMIN — IBUPROFEN 800 MG: 400 TABLET, FILM COATED ORAL at 09:40

## 2019-03-09 RX ADMIN — Medication 1 TABLET: at 09:40

## 2019-03-09 RX ADMIN — FERROUS SULFATE TAB 325 MG (65 MG ELEMENTAL FE) 325 MG: 325 (65 FE) TAB at 09:40

## 2019-03-09 NOTE — ROUTINE PROCESS
2350: TRANSFER - IN REPORT:    Verbal report received from CORONA Evans(name) on Isabelle Lizzy  being received from L+D(unit) for routine progression of care      Report consisted of patients Situation, Background, Assessment and   Recommendations(SBAR). Information from the following report(s) SBAR was reviewed with the receiving nurse. Opportunity for questions and clarification was provided. Assessment completed upon patients arrival to unit and care assumed. 0115: Pt ambulated to bathroom with nursing assistance x 1 with no complications. Pt voided; check void due by 0715. 0230: Pt ambulated to place infant in bassinet; pt now up ad loki. 0430: Pt reports voiding; check void complete.

## 2019-03-09 NOTE — L&D DELIVERY NOTE
CNM Delivery Note       Patient: Arielle Hernandez MRN: [id number]  SSN: [id number]    YOB: 1990  Age: 25 y.o. Sex: female        Complete cervical dilation at 80.  of a live female infant at  with Apgars 9, 9 in SHYANN position under epidural anesthesia with mother in semifowlers position at . Shoulders spontaneous, easily delivered with maternal effort. Vigorous infant placed on maternal abdomen immediately following delivery. Placenta and membranes spontaneous, intact, with 3 vessel cord via Rhiannon Jenny mechanism at . Moderate amount of bleeding noted, steady in flow. Cytotec 1000 mcg given rectally and bladder drained at fundus palpated on maternal right. Large amount of urine drained. Bleeding slowed, normal in amount. Fundus firm and midline. Fundus massaged to firm. Estimated blood loss 600 mL. Vagina and perineum inspected. 1st degree perineal laceration noted and bilateral vaginal skin tears, no repair needed. Hemostatic and well approximated. Mother and infant stable, bonding, establishing breastfeeding. Delivery Summary    Patient: Arielle Hernandez MRN: [id number]  SSN: [id number]    YOB: 1990  Age: 25 y.o. Sex: female       Information for the patient's :  Bradly Narda [[id number]]       Labor Events:    Labor: No   Rupture Date: 3/8/2019   Rupture Time: 3:57 PM   Rupture Type AROM   Amniotic Fluid Volume:  Moderate    Amniotic Fluid Description: Clear None   Induction:         Augmentation:     Labor Events: None     Cervical Ripening: 3/7/2019 6:26 PM Cervidil     Delivery Events:  Episiotomy: None   Laceration(s): First degree perineal;Other (comment) Bilateral vaginal skin tears   Repaired: None    Number of Repair Packets:     Suture Type and Size: None     Estimated Blood Loss (ml): 600ml       Delivery Date: 3/8/2019    Delivery Time: 8:52 PM  Delivery Type: Vaginal, Spontaneous  Sex:  Female     Gestational Age: 37w0d   Delivery Clinician:  Jes Byrd Derrick  Living Status: Living   Delivery Location: L&D Rm 3          APGARS  One minute Five minutes Ten minutes   Skin color: 1   1        Heart rate: 2   2        Grimace: 2   2        Muscle tone: 2   2        Breathin   2        Totals: 9   9            Presentation: Vertex    Position: Left Occiput Anterior  Resuscitation Method:  Suctioning-bulb; Tactile Stimulation     Meconium Stained: None      Cord Information: 3 Vessels  Complications: Nuchal Cord Without Compressions  Cord around: head  Delayed cord clamping? Yes  Cord clamped date/time:   Disposition of Cord Blood: Lab    Blood Gases Sent?: No    Placenta:  Date/Time: 3/8/2019  8:57 PM  Removal: Spontaneous      Appearance: Normal      Measurements:  Birth Weight:        Birth Length:        Head Circumference:        Chest Circumference:       Abdominal Girth: Other Providers:   JANET INIGUEZ;BENEDICT YOO;ARABELLA ASHLEY, Primary Nurse;Primary  Nurse;Midwife           Group B Strep:   Lab Results   Component Value Date/Time    GrBStrep, External negative 2019     Information for the patient's :  Euna Stager [[id number]]   No results found for: ABORH, PCTABR, PCTDIG, BILI, ABORHEXT, ABORH    No results for input(s): PCO2CB, PO2CB, HCO3I, SO2I, IBD, PTEMPI, SPECTI, PHICB, ISITE, IDEV, IALLEN in the last 72 hours.

## 2019-03-09 NOTE — LACTATION NOTE
This note was copied from a baby's chart. Mother has bilateral compression stripes on nipples due to shallow latching at earlier feeds. Mother had recently finished feeding but attempted to wake baby for instruction with me. Baby did not want to wake at that time. I encouraged mother to call for latch checks with feedings.

## 2019-03-09 NOTE — PROGRESS NOTES
Post-Partum Day Number 1 Progress Note    Leonel Cram     Assessment: Doing well, post partum day 1    Plan:  1. Continue routine postpartum and perineal care as well as maternal education. Information for the patient's :  Poli Sharma [[id number]]   Vaginal, Spontaneous   Patient doing well without significant complaint. Voiding without difficulty, normal lochia. Vitals:  Visit Vitals  /63 (BP 1 Location: Left arm, BP Patient Position: At rest)   Pulse 84   Temp 98.7 °F (37.1 °C)   Resp 16   Ht 5' 6\" (1.676 m)   Wt 69.4 kg (153 lb)   SpO2 94%   Breastfeeding? Unknown   BMI 24.69 kg/m²     Temp (24hrs), Av.9 °F (37.2 °C), Min:98 °F (36.7 °C), Max:99.4 °F (37.4 °C)        Exam:   Patient without distress. Abdomen soft, fundus firm, nontender                Perineum with normal lochia noted. Lower extremities are negative for swelling, cords or tenderness. Labs:     Lab Results   Component Value Date/Time    WBC 8.6 2019 06:24 PM    WBC 5.8 2017 10:12 AM    WBC 8.8 2014 03:42 PM    HGB 12.4 2019 06:24 PM    HGB 12.9 2017 10:12 AM    HGB 13.1 2014 03:42 PM    HCT 37.8 2019 06:24 PM    HCT 38.6 2017 10:12 AM    HCT 39.1 2014 03:42 PM    PLATELET 608  06:24 PM    PLATELET 205  10:12 AM    PLATELET 344  03:42 PM       No results found for this or any previous visit (from the past 24 hour(s)).

## 2019-03-10 VITALS
HEART RATE: 83 BPM | OXYGEN SATURATION: 94 % | BODY MASS INDEX: 24.59 KG/M2 | DIASTOLIC BLOOD PRESSURE: 62 MMHG | SYSTOLIC BLOOD PRESSURE: 101 MMHG | HEIGHT: 66 IN | WEIGHT: 153 LBS | RESPIRATION RATE: 18 BRPM | TEMPERATURE: 98.5 F

## 2019-03-10 PROCEDURE — 74011250637 HC RX REV CODE- 250/637: Performed by: ADVANCED PRACTICE MIDWIFE

## 2019-03-10 RX ORDER — IBUPROFEN 800 MG/1
800 TABLET ORAL EVERY 8 HOURS
Qty: 30 TAB | Refills: 1 | Status: SHIPPED | OUTPATIENT
Start: 2019-03-10

## 2019-03-10 RX ADMIN — Medication 1 TABLET: at 08:31

## 2019-03-10 RX ADMIN — FERROUS SULFATE TAB 325 MG (65 MG ELEMENTAL FE) 325 MG: 325 (65 FE) TAB at 08:31

## 2019-03-10 NOTE — DISCHARGE SUMMARY
Obstetrical Discharge Summary     Name: Madison Louis MRN: [id number]  SSN: [id number]    YOB: 1990  Age: 25 y.o. Sex: female      Admit Date: 3/7/2019    Discharge Date: 3/10/2019     Admitting Physician: Maikel Fink MD     Attending Physician:  Jyoti Pineda MD     Admission Diagnoses: Personal history of previous postdates pregnancy [Z87.59]    Discharge Diagnoses:   Information for the patient's :  More Muhammad [[id number]]   Delivery of a 3.515 kg female infant via Vaginal, Spontaneous on 3/8/2019 at 8:52 PM  by . Apgars were 9 and 9. Additional Diagnoses:   Hospital Problems  Date Reviewed: 2017          Codes Class Noted POA    Personal history of previous postdates pregnancy ICD-10-CM: Z87.59  ICD-9-CM: V13.29  3/7/2019 Unknown             Lab Results   Component Value Date/Time    Rubella, External immune 2018    GrBStrep, External negative 2019       Hospital Course: Normal hospital course following the delivery. Patient Instructions:   Current Discharge Medication List      START taking these medications    Details   ibuprofen (MOTRIN) 800 mg tablet Take 1 Tab by mouth every eight (8) hours. Qty: 30 Tab, Refills: 1         CONTINUE these medications which have NOT CHANGED    Details   PNV#16-Iron Fum & PS-FA-OM-3 35-1-200 mg cap Take  by mouth. STOP taking these medications       calcipotriene (DOVONEX) 0.005 % topical cream Comments:   Reason for Stopping:         norethindrone-ethinyl estradiol-iron (LOESTRIN FE 1.5/30, 28-DAY,) 1.5 mg-30 mcg (21)/75 mg (7) tab Comments:   Reason for Stopping:               Disposition at Discharge: Home or self care    Condition at Discharge: Stable    Reference my discharge instructions.     Follow-up Appointments   Procedures    FOLLOW UP VISIT Appointment in: 6 Weeks     Standing Status:   Standing     Number of Occurrences:   1     Order Specific Question:   Appointment in     Answer:   6 Weeks Signed By:  Selvin Matos MD     March 10, 2019

## 2019-03-10 NOTE — PROGRESS NOTES
Post-Partum Day Number 2 Progress Note    Leonel Cram     Assessment: Doing well, post partum day 2    Plan:   1. Discharge home today  2. Follow up in office in 6 weeks with Rafa Walker MD  3. Post partum activity advised, diet as tolerated  4. Discharge Medications: ibuprofen, percocet and medications prior to admission    Information for the patient's :  Poli Sharma [[id number]]   Vaginal, Spontaneous   Patient doing well without significant complaint. Voiding without difficulty, normal lochia. Vitals:  Visit Vitals  /68 (BP 1 Location: Left arm, BP Patient Position: At rest)   Pulse 73   Temp 98 °F (36.7 °C)   Resp 16   Ht 5' 6\" (1.676 m)   Wt 69.4 kg (153 lb)   SpO2 94%   Breastfeeding? Unknown   BMI 24.69 kg/m²     Temp (24hrs), Av.4 °F (36.9 °C), Min:98 °F (36.7 °C), Max:98.7 °F (37.1 °C)      Exam:         Patient without distress. Abdomen soft, fundus firm, nontender                 Lower extremities are negative for swelling, cords or tenderness. Labs:     Lab Results   Component Value Date/Time    WBC 8.6 2019 06:24 PM    WBC 5.8 2017 10:12 AM    WBC 8.8 2014 03:42 PM    HGB 12.4 2019 06:24 PM    HGB 12.9 2017 10:12 AM    HGB 13.1 2014 03:42 PM    HCT 37.8 2019 06:24 PM    HCT 38.6 2017 10:12 AM    HCT 39.1 2014 03:42 PM    PLATELET 210 5340 06:24 PM    PLATELET 764 10/59/9777 10:12 AM    PLATELET 227  03:42 PM       No results found for this or any previous visit (from the past 24 hour(s)).

## 2019-03-10 NOTE — DISCHARGE INSTRUCTIONS
POSTPARTUM DISCHARGE INSTRUCTIONS       Name:  Bhavani Giron  YOB: 1990  Admission Diagnosis:  Personal history of previous postdates pregnancy [Z87.59]     Discharge Diagnosis:    Problem List as of 3/10/2019 Date Reviewed: 9/13/2017          Codes Class Noted - Resolved    Personal history of previous postdates pregnancy ICD-10-CM: Z87.59  ICD-9-CM: V13.29  3/7/2019 - Present        Dysmenorrhea ICD-10-CM: N94.6  ICD-9-CM: 625.3  Unknown - Present        Fibrocystic breast changes ICD-10-CM: N60.19  ICD-9-CM: 610.1  7/8/2014 - Present        Fibrocystic breast ICD-10-CM: N60.19  ICD-9-CM: 610.1  7/8/2014 - Present            Attending Physician:  Sumeet Lerma MD    Delivery Type:  Vaginal Childbirth with Episiotomy, Laceration or Tear: What To Expect At Home Your Recovery    Your body will slowly heal in the next few weeks. It is easy to get too tired and overwhelmed during the first weeks after your baby is born. Changes in your hormones can shift your mood without warning. You may find it hard to meet the extra demands on your energy and time. Take it easy on yourself. Follow-up care is a key part of your treatment and safety. Be sure to make and go to all appointments, and call your doctor if you are having problems. It's also a good idea to know your test results and keep a list of the medicines you take. How can you care for yourself at home? Vaginal Bleeding and Cramps  · After delivery, you will have a bloody discharge from the vagina. This will turn pink within a week and then white or yellow after about 10 days. It may last for 2 to 4 weeks or longer, until the uterus has healed. Use pads instead of tampons until you stop bleeding. · Do not worry if you pass some blood clots, as long as they are smaller than a golf ball. If you have a tear or stitches in your vaginal area, change the pad at least every 4 hours to prevent soreness and infection.     · You may have cramps for the first few days after childbirth. These are normal and occur as the uterus shrinks to normal size. Take an over-the-counter pain medicine, such as acetaminophen (Tylenol), ibuprofen (Advil, Motrin), or naproxen (Aleve), for cramps. Read and follow all instructions on the label. Do not take aspirin, because it can cause more bleeding. Do not take acetaminophen (Tylenol) and other acetaminophen containing medications (i.e. Percocet) at the same time. Episiotomy, Lacerations or Tears  · If you have stitches, they will dissolve on their own and do not need to be removed. · Put ice or a cold pack on your painful area for 10 to 20 minutes at a time, several times a day, for the first few days. Put a thin cloth between the ice and your skin. · Sit in a few inches of warm water (sitz bath) 3 times a day and after bowel movements. The warm water helps with pain and itching. If you do not have a tub, a warm shower might help. Breast fullness  · Your breasts may overfill (engorge) in the first few days after delivery. To help milk flow and to relieve pain, warm your breasts in the shower or by using warm, moist towels before nursing. · If you are not nursing, do not put warmth on your breasts or touch your breasts. Wear a tight bra or sports bra and use ice until the fullness goes away. This usually takes 2 to 3 days. · Put ice or a cold pack on your breast after nursing to reduce swelling and pain. Put a thin cloth between the ice and your skin. Activity  · Eat a balanced diet. Do not try to lose weight by cutting calories. Keep taking your prenatal vitamins, or take a multivitamin. · Get as much rest as you can. Try to take naps when your baby sleeps during the day. · Get some exercise every day. But do not do any heavy exercise until your doctor says it is okay. · Wait until you are healed (about 4 to 6 weeks) before you have sexual intercourse.  Your doctor will tell you when it is okay to have sex.  · Talk to your doctor about birth control. You can get pregnant even before your period returns. Also, you can get pregnant while you are breast-feeding. Mental Health  · Many women get the \"baby blues\" during the first few days after childbirth. You may lose sleep, feel irritable, and cry easily. You may feel happy one minute and sad the next. Hormone changes are one cause of these emotional changes. Also, the demands of a new baby, along with visits from relatives or other family needs, add to a mother's stress. The \"baby blues\" often peak around the fourth day. Then they ease up in less than 2 weeks. · If your moodiness or anxiety lasts for more than 2 weeks, or if you feel like life is not worth living, you may have postpartum depression. This is different for each mother. Some mothers with serious depression may worry intensely about their infant's well-being. Others may feel distant from their child. Some mothers might even feel that they might harm their baby. A mother may have signs of paranoia, wondering if someone is watching her. · With all the changes in your life, you may not know if you are depressed. Pregnancy sometimes causes changes in how you feel that are similar to the symptoms of depression. · Symptoms of depression include:  · Feeling sad or hopeless and losing interest in daily activities. These are the most common symptoms of depression. · Sleeping too much or not enough. · Feeling tired. You may feel as if you have no energy. · Eating too much or too little. · POSTPARTUM SUPPORT [employer]ATIONAL (PSI) offers a Warm line; Chat with the Expert phone sessions; Information and Articles about Pregnancy and Postpartum Mood Disorders; Comprehensive List of Free Support Groups; Knowledgeable local coordinators who will offer support, information, and resources; Guide to Resources on [employer] [place]; Calendar of events in the  mood disorders community;  Latest News and Research; and [address] [facility] for [employer]. Remember - You are not alone; You are not to blame; With help, you will be well. 0-812-044-PPD(6575). WWW. POSTPARTUM. NET    · Writing or talking about death, such as writing suicide notes or talking about guns, knives, or pills. Keep the numbers for these national suicide hotlines: 0-392-385-TALK (9-107.606.2836) and 0-298-GJYFWRZ (1-207.209.2481). If you or someone you know talks about suicide or feeling hopeless, get help right away. Constipation and Hemorrhoids  Drink plenty of fluids, enough so that your urine is light yellow or clear like water. If you have kidney, heart, or liver disease and have to limit fluids, talk with your doctor before you increase the amount of fluids you drink. · Eat plenty of fiber each day. Have a bran muffin or bran cereal for breakfast, and try eating a piece of fruit for a mid-afternoon snack. · For painful, itchy hemorrhoids, put ice or a cold pack on the area several times a day for 10 minutes at a time. Follow this by putting a warm compress on the area for another 10 to 20 minutes or by sitting in a shallow, warm bath. When should you call for help? Call 911 anytime you think you may need emergency care. For example, call if:  · You are thinking of hurting yourself, your baby, or anyone else. · You passed out (lost consciousness). · You have symptoms of a blood clot in your lung (called a pulmonary embolism). These may include:  · Sudden chest pain. · Trouble breathing. · Coughing up blood. Call your doctor now or seek immediate medical care if:  · You have severe vaginal bleeding. · You are soaking through a pad each hour for 2 or more hours. · Your vaginal bleeding seems to be getting heavier or is still bright red 4 days after delivery. · You are dizzy or lightheaded, or you feel like you may faint. · You are vomiting or cannot keep fluids down. · You have a fever. · You have new or more belly pain.   · You pass tissue (not just blood). · Your vaginal discharge smells bad. · Your belly feels tender or full and hard. · Your breasts are continuously painful or red. · You feel sad, anxious, or hopeless for more than a few days. · You have sudden, severe pain in your belly. · You have symptoms of a blood clot in your leg (called a deep vein thrombosis), such as:  · Pain in your calf, back of the knee, thigh, or groin. · Redness and swelling in your leg or groin. · You have symptoms of preeclampsia, such as:  · Sudden swelling of your face, hands, or feet. · New vision problems (such as dimness or blurring). · A severe headache. · Your blood pressure is higher than it should be or rises suddenly. · You have new nausea or vomiting. Watch closely for changes in your health, and be sure to contact your doctor if you have any problems. Additional Information:  Not applicable    These are general instructions for a healthy lifestyle:    No smoking/ No tobacco products/ Avoid exposure to second hand smoke    Surgeon General's Warning:  Quitting smoking now greatly reduces serious risk to your health. Obesity, smoking, and sedentary lifestyle greatly increases your risk for illness    A healthy diet, regular physical exercise & weight monitoring are important for maintaining a healthy lifestyle    Recognize signs and symptoms of STROKE:    F-face looks uneven    A-arms unable to move or move unevenly    S-speech slurred or non-existent    T-time-call 911 as soon as signs and symptoms begin - DO NOT go       back to bed or wait to see if you get better - TIME IS BRAIN. I have had the opportunity to make my options or choices for discharge. I have received and understand these instructions. Depression After Childbirth: Care Instructions  Your Care Instructions    Many women get the \"baby blues\" during the first few days after childbirth. You may lose sleep, feel irritable, and cry easily.  You may feel happy one minute and sad the next. Hormone changes are one cause of these emotional changes. Also, the demands of a new baby, along with visits from relatives or other family needs, add to a mother's stress. The \"baby blues\" often peak around the fourth day. Then they ease up in less than 2 weeks. If your moodiness or anxiety lasts for more than 2 weeks, or if you feel like life is not worth living, you may have postpartum depression. This is different for each mother. Some mothers with serious depression may worry intensely about their infant's well-being. Others may feel distant from their child. Some mothers might even feel that they might harm their baby. A mother may have signs of paranoia, wondering if someone is watching her. Depression is not a sign of weakness. It is a medical condition that requires treatment. Medicine and counseling often work well to reduce depression. Talk to your doctor about taking antidepressant medicine while breastfeeding. Follow-up care is a key part of your treatment and safety. Be sure to make and go to all appointments, and call your doctor if you are having problems. It's also a good idea to know your test results and keep a list of the medicines you take. How do you know if you are depressed? With all the changes in your life, you may not know if you are depressed. Pregnancy sometimes causes changes in how you feel that are similar to the symptoms of depression. Symptoms of depression include:  · Feeling sad or hopeless and losing interest in daily activities. These are the most common symptoms of depression. · Sleeping too much or not enough. · Feeling tired. You may feel as if you have no energy. · Eating too much or too little. · Writing or talking about death, such as writing suicide notes or talking about guns, knives, or pills. Keep the numbers for these national suicide hotlines: 0-640-191-TALK (3-688.650.4533) and 9-696-RJZFEGH (6-148.623.6741).  If you or someone you know talks about suicide or feeling hopeless, get help right away. How can you care for yourself at home? · Be safe with medicines. Take your medicines exactly as prescribed. Call your doctor if you think you are having a problem with your medicine. · Eat a healthy diet so that you can keep up your energy. · Get regular daily exercise, such as walks, to help improve your mood. · Get as much sunlight as possible. Keep your shades and curtains open. Get outside as much as you can. · Avoid using alcohol or other substances to feel better. · Get as much rest and sleep as possible. Avoid doing too much. Being too tired can increase depression. · Play stimulating music throughout your day and soothing music at night. · Schedule outings and visits with friends and family. Ask them to call you regularly, so that you do not feel alone. · Ask for help with preparing food and other daily tasks. Family and friends are often happy to help a mother with a . · Be honest with yourself and those who care about you. Tell them about your struggle. · Join a support group of new mothers. No one can better understand the challenges of caring for a  than other new mothers. · If you feel like life is not worth living or are feeling hopeless, get help right away. Keep the numbers for these national suicide hotlines: 2-098-452-TALK (5-215-272-442.442.8415) and 4-258-TPHETRK (8-892-599-625.504.7320). When should you call for help? Call 911 anytime you think you may need emergency care. For example, call if:    · You feel you cannot stop from hurting yourself, your baby, or someone else.   [facility] your doctor now or seek immediate medical care if:    · You are having trouble caring for yourself or your baby.     · You hear voices.   Stoney Kessler closely for changes in your health, and be sure to contact your doctor if:    · You have problems with your depression medicine.     · You do not get better as expected.    Where can you learn more? Go to http://dominguez-jack.info/. Enter N064 in the search box to learn more about \"Depression After Childbirth: Care Instructions. \"  Current as of: 2018  Content Version: 11.9  © 3795-3702 [employer]. Care instructions adapted under license by [employer] (which disclaims liability or warranty for this information). If you have questions about a medical condition or this instruction, always ask your healthcare professional. [address] any warranty or liability for your use of this information. Postpartum: Care Instructions  Your Care Instructions  After childbirth (postpartum period), your body goes through many changes. Some of these changes happen over several weeks. In the hours after delivery, your body will begin to recover from childbirth while it prepares to breastfeed your . You may feel emotional during this time. Your hormones can shift your mood without warning for no clear reason. In the first couple of weeks after childbirth, many women have emotions that change from happy to sad. You may find it hard to sleep. You may cry a lot. This is called the \"baby blues. \" These overwhelming emotions often go away within a couple of days or weeks. But it's important to discuss your feelings with your doctor. It is easy to get too tired and overwhelmed during the first weeks after childbirth. Don't try to do too much. Get rest whenever you can, accept help from others, and eat well and drink plenty of fluids. About 4 to 6 weeks after your baby's birth, you will have a follow-up visit with your doctor. This visit is your time to talk to your doctor about anything you are concerned or curious about. Follow-up care is a key part of your treatment and safety. Be sure to make and go to all appointments, and call your doctor if you are having problems.  It's also a good idea to know your test results and keep a list of the medicines you take. How can you care for yourself at home? · Sleep or rest when your baby sleeps. · Get help with household chores from family or friends, if you can. Do not try to do it all yourself. · If you have hemorrhoids or swelling or pain around the opening of your vagina, try using cold and heat. You can put ice or a cold pack on the area for 10 to 20 minutes at a time. Put a thin cloth between the ice and your skin. Also try sitting in a few inches of warm water (sitz bath) 3 times a day and after bowel movements. · Take pain medicines exactly as directed. ? If the doctor gave you a prescription medicine for pain, take it as prescribed. ? If you are not taking a prescription pain medicine, ask your doctor if you can take an over-the-counter medicine. · Eat more fiber to avoid constipation. Include foods such as whole-grain breads and cereals, raw vegetables, raw and dried fruits, and beans. · Drink plenty of fluids, enough so that your urine is light yellow or clear like water. If you have kidney, heart, or liver disease and have to limit fluids, talk with your doctor before you increase the amount of fluids you drink. · Do not rinse inside your vagina with fluids (douche). · If you have stitches, keep the area clean by pouring or spraying warm water over the area outside your vagina and anus after you use the toilet. · Keep a list of questions to bring to your postpartum visit. Your questions might be about:  ? Changes in your breasts, such as lumps or soreness. ? When to expect your menstrual period to start again. ? What form of birth control is best for you. ? Weight you have put on during the pregnancy. ? Exercise options. ? What foods and drinks are best for you, especially if you are breastfeeding. ? Problems you might be having with breastfeeding. ? When you can have sex. Some women may want to talk about lubricants for the vagina. ?  Any feelings of sadness or restlessness that you are having. When should you call for help? Call 911 anytime you think you may need emergency care. For example, call if:    · You have thoughts of harming yourself, your baby, or another person.     · You passed out (lost consciousness).    Call your doctor now or seek immediate medical care if:    · Your vaginal bleeding seems to be getting heavier.     · You are dizzy or lightheaded, or you feel like you may faint.     · You have a fever.    Watch closely for changes in your health, and be sure to contact your doctor if:    · You have new or worse vaginal discharge.     · You feel sad or depressed.     · You are having problems with your breasts or breastfeeding. Where can you learn more? Go to http://dominguez-jack.info/. Enter U924 in the search box to learn more about \"Postpartum: Care Instructions. \"  Current as of: September 5, 2018  Content Version: 11.9  © 4522-0545 [employer]. Care instructions adapted under license by [employer] (which disclaims liability or warranty for this information). If you have questions about a medical condition or this instruction, always ask your healthcare professional. [address] any warranty or liability for your use of this information.

## 2019-03-10 NOTE — ROUTINE PROCESS
1930:Bedside and Verbal shift change report given to K. Merlinda Marlin (oncoming nurse) by Meri Abbott (offgoing nurse). Report included the following information SBAR. The beginning of Daylight Saving Time occurred at 0200 hrs. Documentation of patient care and medications administered is done with respect to the time change.